# Patient Record
Sex: MALE | Race: WHITE | NOT HISPANIC OR LATINO | Employment: FULL TIME | URBAN - METROPOLITAN AREA
[De-identification: names, ages, dates, MRNs, and addresses within clinical notes are randomized per-mention and may not be internally consistent; named-entity substitution may affect disease eponyms.]

---

## 2017-10-23 ENCOUNTER — GENERIC CONVERSION - ENCOUNTER (OUTPATIENT)
Dept: OTHER | Facility: OTHER | Age: 62
End: 2017-10-23

## 2017-10-23 ENCOUNTER — ALLSCRIPTS OFFICE VISIT (OUTPATIENT)
Dept: OTHER | Facility: OTHER | Age: 62
End: 2017-10-23

## 2017-10-23 DIAGNOSIS — Z00.00 ENCOUNTER FOR GENERAL ADULT MEDICAL EXAMINATION WITHOUT ABNORMAL FINDINGS: ICD-10-CM

## 2017-12-15 ENCOUNTER — ANESTHESIA EVENT (OUTPATIENT)
Dept: GASTROENTEROLOGY | Facility: MEDICAL CENTER | Age: 62
End: 2017-12-15
Payer: COMMERCIAL

## 2017-12-18 ENCOUNTER — GENERIC CONVERSION - ENCOUNTER (OUTPATIENT)
Dept: GASTROENTEROLOGY | Facility: MEDICAL CENTER | Age: 62
End: 2017-12-18

## 2017-12-18 ENCOUNTER — ANESTHESIA (OUTPATIENT)
Dept: GASTROENTEROLOGY | Facility: MEDICAL CENTER | Age: 62
End: 2017-12-18
Payer: COMMERCIAL

## 2017-12-18 ENCOUNTER — HOSPITAL ENCOUNTER (OUTPATIENT)
Facility: MEDICAL CENTER | Age: 62
Setting detail: OUTPATIENT SURGERY
Discharge: HOME | End: 2017-12-18
Attending: INTERNAL MEDICINE | Admitting: INTERNAL MEDICINE
Payer: COMMERCIAL

## 2017-12-18 VITALS
WEIGHT: 135 LBS | OXYGEN SATURATION: 99 % | RESPIRATION RATE: 18 BRPM | SYSTOLIC BLOOD PRESSURE: 117 MMHG | TEMPERATURE: 97.6 F | DIASTOLIC BLOOD PRESSURE: 80 MMHG | HEART RATE: 83 BPM | HEIGHT: 67 IN | BODY MASS INDEX: 21.19 KG/M2

## 2017-12-18 DIAGNOSIS — R19.5 OTHER FECAL ABNORMALITIES: ICD-10-CM

## 2017-12-18 PROCEDURE — 88305 TISSUE EXAM BY PATHOLOGIST: CPT | Performed by: INTERNAL MEDICINE

## 2017-12-18 RX ORDER — EZETIMIBE AND SIMVASTATIN 10; 10 MG/1; MG/1
1 TABLET ORAL
COMMUNITY

## 2017-12-18 RX ORDER — PROPOFOL 10 MG/ML
INJECTION, EMULSION INTRAVENOUS AS NEEDED
Status: DISCONTINUED | OUTPATIENT
Start: 2017-12-18 | End: 2017-12-18 | Stop reason: SURG

## 2017-12-18 RX ORDER — SODIUM CHLORIDE 9 MG/ML
125 INJECTION, SOLUTION INTRAVENOUS CONTINUOUS
Status: DISCONTINUED | OUTPATIENT
Start: 2017-12-18 | End: 2017-12-18 | Stop reason: HOSPADM

## 2017-12-18 RX ADMIN — PROPOFOL 25 MG: 10 INJECTION, EMULSION INTRAVENOUS at 08:22

## 2017-12-18 RX ADMIN — PROPOFOL 25 MG: 10 INJECTION, EMULSION INTRAVENOUS at 08:12

## 2017-12-18 RX ADMIN — LIDOCAINE HYDROCHLORIDE 50 MG: 20 INJECTION, SOLUTION INTRAVENOUS at 08:07

## 2017-12-18 RX ADMIN — PROPOFOL 100 MG: 10 INJECTION, EMULSION INTRAVENOUS at 08:07

## 2017-12-18 RX ADMIN — SODIUM CHLORIDE 125 ML/HR: 0.9 INJECTION, SOLUTION INTRAVENOUS at 07:35

## 2017-12-18 RX ADMIN — PROPOFOL 25 MG: 10 INJECTION, EMULSION INTRAVENOUS at 08:17

## 2017-12-18 RX ADMIN — SODIUM CHLORIDE: 0.9 INJECTION, SOLUTION INTRAVENOUS at 07:58

## 2017-12-18 RX ADMIN — PROPOFOL 25 MG: 10 INJECTION, EMULSION INTRAVENOUS at 08:27

## 2017-12-18 NOTE — ANESTHESIA PREPROCEDURE EVALUATION
Review of Systems/Medical History  Patient summary reviewed  Chart reviewed      Cardiovascular  Hyperlipidemia,    Pulmonary  Negative pulmonary ROS ,        GI/Hepatic    Bowel prep       Negative  ROS        Endo/Other  Negative endo/other ROS      GYN  Negative gynecology ROS          Hematology  Negative hematology ROS      Musculoskeletal  Negative musculoskeletal ROS        Neurology  Negative neurology ROS      Psychology   Negative psychology ROS            Physical Exam    Airway    Mallampati score: II  TM Distance: >3 FB  Neck ROM: full     Dental   No notable dental hx     Cardiovascular  Rhythm: regular, Rate: normal, Cardiovascular exam normal    Pulmonary  Pulmonary exam normal Breath sounds clear to auscultation,     Other Findings        Anesthesia Plan  ASA Score- 2       Anesthesia Type- IV sedation with anesthesia with ASA Monitors  Additional Monitors:   Airway Plan:           Induction- intravenous  Informed Consent- Anesthetic plan and risks discussed with patient

## 2017-12-18 NOTE — OP NOTE
**** GI/ENDOSCOPY REPORT ****     PATIENT NAME: Deanna Ferguson ------ VISIT ID:  Patient ID: [id number]   YOB: 1955     INTRODUCTION: Colonoscopy - A 63 male patient presents for an outpatient   Colonoscopy at [address] [place] [address]  PREVIOUS COLONOSCOPY: none prior     INDICATIONS: Positive FIT, family history of colon cancer in his brother   diagnosed at age 73  CONSENT:  The benefits, risks, and alternatives to the procedure were   discussed and informed consent was obtained from the patient  PREPARATION: EKG, pulse, pulse oximetry and blood pressure were monitored   throughout the procedure  The patient was identified by myself both   verbally and by visual inspection of ID band  Airway Assessment   Classification: Airway class 2 - Visualization of the soft palate, fauces   and uvula  ASA Classification: See anesthesia record  MEDICATIONS: Anesthesia-check records     PROCEDURE:  The endoscope was passed without difficulty through the anus   under direct visualization and advanced to the cecum, confirmed by   appendiceal orifice and ileocecal valve  The scope was withdrawn and the   mucosa was carefully examined  The quality of the preparation was good  Cecal Intubation Time: 2 minutes(s) Scope Withdrawal Time: 17 minutes(s)     RECTAL EXAM: Normal rectal exam      FINDINGS:   A single sessile polyp, measuring 6 mm in size, was found in   the sigmoid colon  The polyp was removed by snare cautery polypectomy  A   single pedunculated polyp, measuring 1 cm in size, was found in the   sigmoid colon  The polyp was removed by snare cautery polypectomy  Otherwise, the colon appeared to be normal      COMPLICATIONS: There were no complications  IMPRESSIONS: A single sessile polyp found in the sigmoid colon; removed by   snare cautery polypectomy  A single pedunculated polyp found in the   sigmoid colon; removed by snare cautery polypectomy  RECOMMENDATIONS: Await pathology results, repeat colonoscopy based on   pathology but likely 3 years given size of larger polyp  ESTIMATED BLOOD LOSS:     PATHOLOGY SPECIMENS: Removed by snare cautery polypectomy  Removed by   snare cautery polypectomy  PROCEDURE CODES:     ICD-9 Codes: 211 3 Benign neoplasm of colon 211 3 Benign neoplasm of colon     ICD-10 Codes: K63 5 Polyp of colon K63 5 Polyp of colon     PERFORMED BY: TONIO Howard  on 12/18/2017  Version 2, modified and electronically signed by TONIO Bermudez    on 12/18/2017 at 08:57, superseding version 1 signed on 12/18/2017 at   08:57

## 2017-12-18 NOTE — DISCHARGE INSTRUCTIONS
Colonoscopy   WHAT YOU NEED TO KNOW:   A colonoscopy is a procedure to examine the inside of your colon (intestine) with a scope  Polyps or tissue growths may have been removed during your colonoscopy  It is normal to feel bloated and to have some abdominal discomfort  You should be passing gas  If you have hemorrhoids or you had polyps removed, you may have a small amount of bleeding  DISCHARGE INSTRUCTIONS:   Seek care immediately if:   · You have a large amount of bright red blood in your bowel movements  · Your abdomen is hard and firm and you have severe pain  · You have sudden trouble breathing  Contact your healthcare provider if:   · You develop a rash or hives  · You have a fever within 24 hours of your procedure  · You have not had a bowel movement for 3 days after your procedure  · You have questions or concerns about your condition or care  Activity:   · Do not lift, strain, or run  for 3 days after your procedure  · Rest after your procedure  You have been given medicine to relax you  Do not  drive or make important decisions until the day after your procedure  Return to your normal activity as directed  · Relieve gas and discomfort from bloating  by lying on your right side with a heating pad on your abdomen  You may need to take short walks to help the gas move out  Eat small meals until bloating is relieved  If you had polyps removed: For 7 days after your procedure:  · Do not  take aspirin  · Do not  go on long car rides  Help prevent constipation:   · Eat a variety of healthy foods  Healthy foods include fruit, vegetables, whole-grain breads, low-fat dairy products, beans, lean meat, and fish  Ask if you need to be on a special diet  Your healthcare provider may recommend that you eat high-fiber foods such as cooked beans  Fiber helps you have regular bowel movements  · Drink liquids as directed    Adults should drink between 9 and 13 eight-ounce cups of liquid every day  Ask what amount is best for you  For most people, good liquids to drink are water, juice, and milk  · Exercise as directed  Talk to your healthcare provider about the best exercise plan for you  Exercise can help prevent constipation, decrease your blood pressure and improve your health  Follow up with your healthcare provider as directed:  Write down your questions so you remember to ask them during your visits  © 2017 [address] [employer] Information is for End User's use only and may not be sold, redistributed or otherwise used for commercial purposes  All illustrations and images included in CareNotes® are the copyrighted property of [employer]  or [address]  The above information is an  only  It is not intended as medical advice for individual conditions or treatments  Talk to your doctor, nurse or pharmacist before following any medical regimen to see if it is safe and effective for you  Colorectal Polyps   WHAT YOU NEED TO KNOW:   Colorectal polyps are small growths of tissue in the lining of the colon and rectum  Most polyps are hyperplastic polyps and are usually benign (noncancerous)  Certain types of polyps, called adenomatous polyps, may turn into cancer  DISCHARGE INSTRUCTIONS:   Follow up with your healthcare provider or gastroenterologist as directed: You may need to return for more tests, such as another colonoscopy  Write down your questions so you remember to ask them during your visits  Reduce your risk for colorectal polyps:   · Eat a variety of healthy foods:  Healthy foods include fruit, vegetables, whole-grain breads, low-fat dairy products, beans, lean meat, and fish  Ask if you need to be on a special diet  · Maintain a healthy weight:  Ask your healthcare provider if you need to lose weight and how much you need to lose   Ask for help with a weight loss program     · Exercise:  Begin to exercise slowly and do more as you get stronger  Talk with your healthcare provider before you start an exercise program      · Limit alcohol:  Your risk for polyps increases the more you drink  · Do not smoke: If you smoke, it is never too late to quit  Ask for information about how to stop  For support and more information:   · Mirian Ramos ([facility])  [address] , [place] [address]  Phone: 4- 262 - [phone]  Web Address: www digestive  niddk nih gov  Contact your healthcare provider or gastroenterologist if:   · You have a fever  · You have chills, a cough, or feel weak and achy  · You have abdominal pain that does not go away or gets worse after you take medicine  · Your abdomen is swollen  · You are losing weight without trying  · You have questions or concerns about your condition or care  Seek care immediately or call 911 if:   · You have sudden shortness of breath  · You have a fast heart rate, fast breathing, or are too dizzy to stand up  · You have severe abdominal pain  · You see blood in your bowel movement  © 2017 [address] [facility] Information is for End User's use only and may not be sold, redistributed or otherwise used for commercial purposes  All illustrations and images included in CareNotes® are the copyrighted property of [employer]  or Tomas Owens  The above information is an  only  It is not intended as medical advice for individual conditions or treatments  Talk to your doctor, nurse or pharmacist before following any medical regimen to see if it is safe and effective for you

## 2017-12-18 NOTE — ANESTHESIA POSTPROCEDURE EVALUATION
Post-Op Assessment Note      CV Status:  Stable    Mental Status:  Alert and awake    Hydration Status:  Euvolemic    PONV Controlled:  Controlled    Airway Patency:  Patent    Post Op Vitals Reviewed: Yes          Staff: Anesthesiologist           /73 (12/18/17 0837)    Temp      Pulse 84 (12/18/17 0837)   Resp 16 (12/18/17 0837)    SpO2 98 % (12/18/17 0837)

## 2017-12-18 NOTE — OP NOTE
**** GI/ENDOSCOPY REPORT ****     PATIENT NAME: Bobby Escudero ------ VISIT ID:  Patient ID: [id number]   YOB: 1955     INTRODUCTION: Colonoscopy - A 63 male patient presents for an outpatient   Colonoscopy at [address] [place] [address]  PREVIOUS COLONOSCOPY: none prior     INDICATIONS: Positive FIT, family history of colon cancer in his brother   diagnosed at age 73  CONSENT:  The benefits, risks, and alternatives to the procedure were   discussed and informed consent was obtained from the patient  PREPARATION: EKG, pulse, pulse oximetry and blood pressure were monitored   throughout the procedure  The patient was identified by myself both   verbally and by visual inspection of ID band  Airway Assessment   Classification: Airway class 2 - Visualization of the soft palate, fauces   and uvula  ASA Classification: See anesthesia record  MEDICATIONS: Anesthesia-check records     PROCEDURE:  The endoscope was passed without difficulty through the anus   under direct visualization and advanced to the cecum, confirmed by   appendiceal orifice and ileocecal valve  The scope was withdrawn and the   mucosa was carefully examined  The quality of the preparation was good  Cecal Intubation Time: 2 minutes(s) Scope Withdrawal Time: 17 minutes(s)     RECTAL EXAM: Normal rectal exam      FINDINGS:   A single sessile polyp, measuring 6 mm in size, was found in   the sigmoid colon  The polyp was removed by snare cautery polypectomy  A   single pedunculated polyp, measuring 1 cm in size, was found in the   sigmoid colon  The polyp was removed by snare cautery polypectomy  Otherwise, the colon appeared to be normal      COMPLICATIONS: There were no complications  IMPRESSIONS: A single sessile polyp found in the sigmoid colon; removed by   snare cautery polypectomy  A single pedunculated polyp found in the   sigmoid colon; removed by snare cautery polypectomy  RECOMMENDATIONS: Await pathology results, repeat colonoscopy based on   pathology but likely 3 years given size of larger polyp  ESTIMATED BLOOD LOSS:     PATHOLOGY SPECIMENS: Removed by snare cautery polypectomy  Removed by   snare cautery polypectomy  PROCEDURE CODES:     ICD-9 Codes: 211 3 Benign neoplasm of colon 211 3 Benign neoplasm of colon     ICD-10 Codes: K63 5 Polyp of colon K63 5 Polyp of colon     PERFORMED BY: TONIO Saenz  on 12/18/2017  Version 2, modified and electronically signed by TONIO Peña    on 12/18/2017 at 08:57, superseding version 1 signed on 12/18/2017 at   08:57

## 2018-01-19 ENCOUNTER — GENERIC CONVERSION - ENCOUNTER (OUTPATIENT)
Dept: OTHER | Facility: OTHER | Age: 63
End: 2018-01-19

## 2018-01-22 VITALS
WEIGHT: 141.38 LBS | OXYGEN SATURATION: 98 % | HEART RATE: 88 BPM | SYSTOLIC BLOOD PRESSURE: 118 MMHG | TEMPERATURE: 97 F | DIASTOLIC BLOOD PRESSURE: 82 MMHG | HEIGHT: 67 IN | BODY MASS INDEX: 22.19 KG/M2

## 2018-01-23 NOTE — RESULT NOTES
Verified Results  (1) TISSUE EXAM [id number] 08:27AM Rei Britton     Test Name Result Flag Reference   LAB AP CASE REPORT (Report)     Surgical Pathology Report             Case: [id number]                   Authorizing Provider: Maria Antonia Kapoor DO    Collected:      12/18/2017 0827        Ordering Location:   [facility]    Received:      12/18/2017 [address]  [facility]                            Pathologist:      Gilbert Jain DO                               Specimens:  A) - Polyp, Colorectal, sigmoid colon polyp 1- hot snare                       B) - Polyp, Colorectal, sigmoid colon polyp 2- hot snare   LAB AP FINAL DIAGNOSIS (Report)     A  Colon, sigmoid polyp #1, biopsy:  - Tubular adenoma, negative for high-grade dysplasia  B  Colon, sigmoid polyp #2, biopsy:  - Tubular adenoma, negative for high-grade dysplasia  Interpretation performed at , [address]    Electronically signed by Gilbert Jain DO on 12/21/2017 at 10:47 AM   LAB AP SURGICAL ADDITIONAL INFORMATION (Report)     All controls performed with the immunohistochemical stains reported above   reacted appropriately  These tests were developed and their performance   characteristics determined by [facility] or   [facility]  They may not be cleared or approved by the [place]  Food and Drug Administration  The FDA has determined that such clearance   or approval is not necessary  These tests are used for clinical purposes  They should not be regarded as investigational or for research  This   laboratory has been approved by CLIA 88, designated as a high-complexity   laboratory and is qualified to perform these tests  LAB AP GROSS DESCRIPTION (Report)     A  The specimen is received in formalin, labeled with the patient's name   and hospital number, and is designated sigmoid colon polyp 1   The   specimen consists of 1 tan polypoid tissue fragment measuring 0 4 cm in   greatest dimension  Bisected and entirely submitted in 1 cassette  B  The specimen is received in formalin, labeled with the patient's name   and hospital number, and is designated Sigmoid colon polyp 2 and  The   specimen consists of a tan-red polypoid tissue fragment measuring 0 8 cm   in greatest dimension  Also submitted in the container are multiple tan   soft tissue fragments measuring in loose aggregate 0 7 x 0 4 x 0 1 cm  Entirely submitted in 2 cassettes with the multiple fragments in cassette   1 and the polyp trisected in cassette 2  Note: The estimated total formalin fixation time based upon information   provided by the submitting clinician and the standard processing schedule   is under 72 hours  Nora PHILLIPS CLINICAL INFORMATION (Report)     Colonoscopy:FINDINGS:  A single sessile polyp, measuring 6 mm in size, was found in   the sigmoid colon  The polyp was removed by snare cautery polypectomy  A   single pedunculated polyp, measuring 1 cm in size, was found in the   sigmoid colon  The polyp was removed by snare cautery polypectomy     Otherwise, the colon appeared to be normal